# Patient Record
Sex: FEMALE | Race: WHITE | NOT HISPANIC OR LATINO | Employment: FULL TIME | URBAN - METROPOLITAN AREA
[De-identification: names, ages, dates, MRNs, and addresses within clinical notes are randomized per-mention and may not be internally consistent; named-entity substitution may affect disease eponyms.]

---

## 2021-02-09 ENCOUNTER — OFFICE VISIT (OUTPATIENT)
Dept: PULMONOLOGY | Facility: CLINIC | Age: 36
End: 2021-02-09

## 2021-02-09 VITALS
DIASTOLIC BLOOD PRESSURE: 86 MMHG | TEMPERATURE: 97.7 F | SYSTOLIC BLOOD PRESSURE: 136 MMHG | WEIGHT: 203.6 LBS | HEART RATE: 80 BPM | BODY MASS INDEX: 36.07 KG/M2 | OXYGEN SATURATION: 97 % | HEIGHT: 63 IN

## 2021-02-09 DIAGNOSIS — R05.9 COUGH: Primary | ICD-10-CM

## 2021-02-09 DIAGNOSIS — U09.9 POST-COVID SYNDROME: ICD-10-CM

## 2021-02-09 DIAGNOSIS — R53.83 FATIGUE, UNSPECIFIED TYPE: ICD-10-CM

## 2021-02-09 LAB
ANION GAP SERPL CALCULATED.3IONS-SCNC: 11.2 MMOL/L (ref 5–15)
BASOPHILS # BLD AUTO: 0.04 10*3/MM3 (ref 0–0.2)
BASOPHILS NFR BLD AUTO: 0.4 % (ref 0–1.5)
BUN SERPL-MCNC: 14 MG/DL (ref 6–20)
BUN/CREAT SERPL: 23 (ref 7–25)
CALCIUM SPEC-SCNC: 9.5 MG/DL (ref 8.6–10.5)
CHLORIDE SERPL-SCNC: 102 MMOL/L (ref 98–107)
CO2 SERPL-SCNC: 23.8 MMOL/L (ref 22–29)
CREAT SERPL-MCNC: 0.61 MG/DL (ref 0.57–1)
DEPRECATED RDW RBC AUTO: 44.8 FL (ref 37–54)
EOSINOPHIL # BLD AUTO: 0.39 10*3/MM3 (ref 0–0.4)
EOSINOPHIL NFR BLD AUTO: 3.6 % (ref 0.3–6.2)
ERYTHROCYTE [DISTWIDTH] IN BLOOD BY AUTOMATED COUNT: 14.2 % (ref 12.3–15.4)
GFR SERPL CREATININE-BSD FRML MDRD: 112 ML/MIN/1.73
GLUCOSE SERPL-MCNC: 102 MG/DL (ref 65–99)
HCT VFR BLD AUTO: 41.8 % (ref 34–46.6)
HGB BLD-MCNC: 14.2 G/DL (ref 12–15.9)
IMM GRANULOCYTES # BLD AUTO: 0.05 10*3/MM3 (ref 0–0.05)
IMM GRANULOCYTES NFR BLD AUTO: 0.5 % (ref 0–0.5)
LYMPHOCYTES # BLD AUTO: 2.21 10*3/MM3 (ref 0.7–3.1)
LYMPHOCYTES NFR BLD AUTO: 20.7 % (ref 19.6–45.3)
MCH RBC QN AUTO: 29.4 PG (ref 26.6–33)
MCHC RBC AUTO-ENTMCNC: 34 G/DL (ref 31.5–35.7)
MCV RBC AUTO: 86.5 FL (ref 79–97)
MONOCYTES # BLD AUTO: 0.84 10*3/MM3 (ref 0.1–0.9)
MONOCYTES NFR BLD AUTO: 7.9 % (ref 5–12)
NEUTROPHILS NFR BLD AUTO: 66.9 % (ref 42.7–76)
NEUTROPHILS NFR BLD AUTO: 7.16 10*3/MM3 (ref 1.7–7)
NRBC BLD AUTO-RTO: 0 /100 WBC (ref 0–0.2)
PLATELET # BLD AUTO: 232 10*3/MM3 (ref 140–450)
PMV BLD AUTO: 10.8 FL (ref 6–12)
POTASSIUM SERPL-SCNC: 4.3 MMOL/L (ref 3.5–5.2)
RBC # BLD AUTO: 4.83 10*6/MM3 (ref 3.77–5.28)
SODIUM SERPL-SCNC: 137 MMOL/L (ref 136–145)
WBC # BLD AUTO: 10.69 10*3/MM3 (ref 3.4–10.8)

## 2021-02-09 PROCEDURE — 94375 RESPIRATORY FLOW VOLUME LOOP: CPT | Performed by: INTERNAL MEDICINE

## 2021-02-09 PROCEDURE — 94618 PULMONARY STRESS TESTING: CPT | Performed by: INTERNAL MEDICINE

## 2021-02-09 PROCEDURE — 94729 DIFFUSING CAPACITY: CPT | Performed by: INTERNAL MEDICINE

## 2021-02-09 PROCEDURE — 99204 OFFICE O/P NEW MOD 45 MIN: CPT | Performed by: INTERNAL MEDICINE

## 2021-02-09 PROCEDURE — 85025 COMPLETE CBC W/AUTO DIFF WBC: CPT | Performed by: INTERNAL MEDICINE

## 2021-02-09 PROCEDURE — 94726 PLETHYSMOGRAPHY LUNG VOLUMES: CPT | Performed by: INTERNAL MEDICINE

## 2021-02-09 PROCEDURE — 80048 BASIC METABOLIC PNL TOTAL CA: CPT | Performed by: INTERNAL MEDICINE

## 2021-02-09 NOTE — PROGRESS NOTES
"Pulmonary Clinic  Initial Office Evaluation     REFERRING PHYSICIAN:  Sue Russell APRN    Subjective   Reason for Visit:     Cough and Shortness of Breath   Covid 19 Pneumonia January 2021    HPI     12/30/20 Sxs begun: cough, sneeze, dyspnea, fevers, fatigue, chest tightness, anosmia, ageusia.   01/02/21 SARS-CoV-2 PCR (+)   01/18/21 Office visit with her PCP, CXR indicated pneumoina.   01/27/21 Cough and dyspnea, persisted.   Round of steroids x 10 days, and Azithromycin   02/03/21 Office visit, started on Breo.     PRIEST persists.  Dry coughs persists.  Her  was also positive, but recovered much faster than her, and he was less symptomatic.  She has never had a SpO2 checked during this time.     PMH: She  has a past medical history of Elevated blood pressure reading.    PSxH: She has no past surgical history on file.        There is no immunization history on file for this patient.      Medications:     Current Outpatient Medications:   •  Fluticasone Furoate-Vilanterol (Breo Ellipta) 100-25 MCG/INH inhaler, Inhale 1 puff Daily., Disp: , Rfl:     Allergies: She is allergic to lasix [furosemide].    FH: She family history includes COPD in her mother; Hypertension in her father.    SH: She  reports that she has never smoked. She has never used smokeless tobacco. She reports current alcohol use. She reports that she does not use drugs.      The patient's relevant past medical, surgical and social history were reviewed and updated in Epic as appropriate.    ROS:   Review of Systems   Constitutional: Positive for fatigue and fever.   HENT: Positive for rhinorrhea and sneezing.    Respiratory: Positive for cough, chest tightness and shortness of breath.    Gastrointestinal: Positive for constipation.       Objective   OBJECTIVE     Vitals:    02/09/21 0931   BP: 136/86   Pulse: 80   Temp: 97.7 °F (36.5 °C)   SpO2: 97%  Comment: resting, room air   Weight: 92.4 kg (203 lb 9.6 oz)   Height: 158.8 cm (62.5\") "     Body mass index is 36.65 kg/m².    Physical Examination    Constitutional:  No acute distress.   Neck: No JVD.   Cardiovascular: Normal rate, regular and rhythm.  No murmurs, gallop or rub.   Respiratory: No adventitious sounds.   Extremities: No Edema     Result Review      Diagnostic Data  CXR 01/1/21 ([facility]): Mild bilateral interstitial and airspace disease.  CXR 02/09/21  NAD       Spirometry 2/9/2021:  Normal Spirometry.  The maximum voluntary ventilation (MVV) is normal.  Lung volumes are normal.   The diffusing capacity is normal.    Exercise Oximetry:  Exercise pulse oximetry showed a SpO2 cesario = 98% on room air. Distance 560 ft. Percent of predicted: 65 %    SpO2 Readings from Last 3 Encounters:   02/09/21 97%        Assessment/Plan   ASSESSMENT / PLAN     Assessment:    1. Post-Covid syndrome  a. Dyspnea, chronic cough, fatigue.  2. Elevated BP, but no diagnosis of HTN. Currently keeping a log, before deciding on medications.      Plan:  Orders Placed This Encounter   Procedures   • XR Chest PA & Lateral   • CT Angiogram Chest   • CT Chest Hi Resolution   • Basic Metabolic Panel   • D-dimer, Quantitative   • Pulmonary Function Test   • CBC & Differential     Patient Instructions   1. CT scan chest (CTA and HRCT)  2. RTC with results    Return in about 2 weeks (around 2/23/2021) for Recheck with Results.    I discussed the diagnosis, evaluation, and  treatment options with the patient and/or appropriate family members.    Thank you very much for allowing me to participate in the care of your patient.    I spent 48 minutes on this date of service. This time includes time spent by me in the following activities:preparing for the visit, reviewing tests, obtaining and/or reviewing a separately obtained history, performing a medically appropriate examination, counseling the patient/family/caregiver, ordering medications, tests, or procedures, and/or documenting information in the medical  record    Rafael Tanner MD, FACP, FCCP, [facility]  Intensive Care Medicine and Pulmonary Medicine     C.C.: Sue Russell, APRN

## 2021-02-26 ENCOUNTER — HOSPITAL ENCOUNTER (OUTPATIENT)
Dept: CT IMAGING | Facility: HOSPITAL | Age: 36
Discharge: HOME | End: 2021-02-26
Admitting: INTERNAL MEDICINE

## 2021-02-26 DIAGNOSIS — U09.9 POST-COVID SYNDROME: ICD-10-CM

## 2021-02-26 PROCEDURE — 71275 CT ANGIOGRAPHY CHEST: CPT

## 2021-02-26 PROCEDURE — 0 IOPAMIDOL PER 1 ML: Performed by: INTERNAL MEDICINE

## 2021-02-26 PROCEDURE — 71250 CT THORAX DX C-: CPT

## 2021-02-26 RX ADMIN — IOPAMIDOL 75 ML: 755 INJECTION, SOLUTION INTRAVENOUS at 13:58

## 2021-03-09 ENCOUNTER — OFFICE VISIT (OUTPATIENT)
Dept: PULMONOLOGY | Facility: CLINIC | Age: 36
End: 2021-03-09

## 2021-03-09 VITALS
BODY MASS INDEX: 35.79 KG/M2 | TEMPERATURE: 97.1 F | SYSTOLIC BLOOD PRESSURE: 130 MMHG | HEIGHT: 63 IN | DIASTOLIC BLOOD PRESSURE: 90 MMHG | RESPIRATION RATE: 18 BRPM | WEIGHT: 202 LBS | HEART RATE: 96 BPM | OXYGEN SATURATION: 99 %

## 2021-03-09 DIAGNOSIS — K80.20 GALLSTONES: Primary | ICD-10-CM

## 2021-03-09 PROBLEM — U09.9 POST-COVID SYNDROME: Status: ACTIVE | Noted: 2021-03-09

## 2021-03-09 PROBLEM — I10 HTN (HYPERTENSION): Status: ACTIVE | Noted: 2021-03-09

## 2021-03-09 PROCEDURE — 99214 OFFICE O/P EST MOD 30 MIN: CPT | Performed by: INTERNAL MEDICINE

## 2021-03-09 RX ORDER — LISINOPRIL 5 MG/1
5 TABLET ORAL DAILY
COMMUNITY
Start: 2021-02-17

## 2021-03-09 NOTE — PROGRESS NOTES
"Pulmonary Follow-up Visit     Subjective   Reason for Visit:   Post CT (f/u)    SUBJECTIVE     LAST OFFICE VISIT:  2/9/2021    Doing well.  Main c/o FATIGUE.  Occ coughing spell, and some chest tightnes.    No fevers.  No sputum production.  No wheezing.    Here to discuss results from CT, she read the report, but she does not know what those terms mean.     Tobacco History: She  reports that she has never smoked. She has never used smokeless tobacco.     ROS:  Review of Systems   Constitutional: Positive for fatigue.   Respiratory: Positive for cough, chest tightness and shortness of breath.    Psychiatric/Behavioral: Positive for sleep disturbance (Female, fatigue, HBP).       Objective   OBJECTIVE     Vitals:    03/09/21 1339   BP: 130/90   BP Location: Left arm   Patient Position: Sitting   Cuff Size: Adult   Pulse: 96   Resp: 18   Temp: 97.1 °F (36.2 °C)   SpO2: 99%  Comment: room air at rest   Weight: 91.6 kg (202 lb)   Height: 158.8 cm (62.5\")     Body mass index is 36.36 kg/m².    Physical Examination    Constitutional:  No acute distress.   Neck: No JVD.   Cardiovascular: Normal rate, regular and rhythm.  No murmurs, gallop or rub.   Respiratory: No adventitious sounds.   Extremities: No Edema     Diagnostic Data    CT Angiogram Chest    Result Date: 2/27/2021  1. No evidence of pulmonary embolus or other clearly acute chest disease. 2. Mild symmetric interstitial changes in the posterior portions of both lungs, which is thought to represent incidental dependent atelectasis in a supine patient. 3. Contracted gallbladder filled with gallstones. No visible inflammation.  D:  02/26/2021 E:  02/26/2021  This report was finalized on 2/27/2021 7:32 AM by Dr. Donell Prater MD.      CT Chest Hi Resolution Diagnostic    Result Date: 2/27/2021  No evidence of significant pulmonary interstitial disease or significant acute or chronic chest disease. Evidence of old granulomatous disease, and gallstones again incidentally " "noted.  D:  02/26/2021 E:  02/26/2021    This report was finalized on 2/27/2021 7:50 AM by Dr. Donell Prater MD.      XR Chest PA & Lateral    Result Date: 2/9/2021  No acute parenchymal disease. Rafael Tanner MD, FACP, FCCP, [facility] Intensive Care Medicine, Nutrition Support and Pulmonary Medicine     SpO2 Readings from Last 3 Encounters:   03/09/21 99%   02/09/21 97%      Assessment/Plan   ASSESSMENT / PLAN     Assessment:    1. Gallstones      There is no problem list on file for this patient.    1. Post-Covid syndrome (Symptoms started 12/30/20)  a. Fatigue about the same. Cough improving.  b. CT only showed minimal interstitial changed in the CTA,  But HRCT showed no interstitial disease. Old granulomatous disease present. Incidental finding of a \"contracted gallbladder filled with gallstones\".  2. HTN. She was started on ACEI by her PCP. BP better.    Plan:  Orders Placed This Encounter   Procedures   • Ambulatory Referral to General Surgery     Patient Instructions   3. OK to get the Covid 19 Vaccine  4. General Surgery re: gallstones.  5. RTC in 2 months    Return in about 2 months (around 5/9/2021).    I discussed the diagnosis, evaluation, and  treatment options with the patient and/or appropriate family members.    Thank you very much for allowing me to participate in the care of your patient.    I spent 31 minutes on this date of service. This time includes time spent by me in the following activities:preparing for the visit, reviewing tests, obtaining and/or reviewing a separately obtained history, performing a medically appropriate examination, counseling the patient/family/caregiver, ordering medications, tests, or procedures, and/or documenting information in the medical record.    ABDELRAHMAN.: Sue Russell, INES                    "

## 2021-03-16 ENCOUNTER — OFFICE VISIT (OUTPATIENT)
Dept: SURGERY | Facility: CLINIC | Age: 36
End: 2021-03-16

## 2021-03-16 ENCOUNTER — LAB (OUTPATIENT)
Dept: LAB | Facility: HOSPITAL | Age: 36
End: 2021-03-16

## 2021-03-16 ENCOUNTER — HOSPITAL ENCOUNTER (OUTPATIENT)
Dept: GENERAL RADIOLOGY | Facility: HOSPITAL | Age: 36
Discharge: HOME | End: 2021-03-16

## 2021-03-16 VITALS
OXYGEN SATURATION: 99 % | WEIGHT: 200 LBS | HEART RATE: 110 BPM | BODY MASS INDEX: 35.44 KG/M2 | HEIGHT: 63 IN | TEMPERATURE: 98.6 F | SYSTOLIC BLOOD PRESSURE: 132 MMHG | DIASTOLIC BLOOD PRESSURE: 96 MMHG

## 2021-03-16 DIAGNOSIS — K80.12 CALCULUS OF GALLBLADDER WITH ACUTE ON CHRONIC CHOLECYSTITIS WITHOUT OBSTRUCTION: ICD-10-CM

## 2021-03-16 DIAGNOSIS — Z01.818 PREOP TESTING: Primary | ICD-10-CM

## 2021-03-16 DIAGNOSIS — Z01.818 PREOP TESTING: ICD-10-CM

## 2021-03-16 DIAGNOSIS — R10.10 PAIN OF UPPER ABDOMEN: Primary | ICD-10-CM

## 2021-03-16 LAB
ALBUMIN SERPL-MCNC: 4.9 G/DL (ref 3.5–5.2)
ALBUMIN/GLOB SERPL: 1.8 G/DL
ALP SERPL-CCNC: 76 U/L (ref 39–117)
ALT SERPL W P-5'-P-CCNC: 22 U/L (ref 1–33)
ANION GAP SERPL CALCULATED.3IONS-SCNC: 12.6 MMOL/L (ref 5–15)
AST SERPL-CCNC: 17 U/L (ref 1–32)
B-HCG UR QL: NEGATIVE
BILIRUB SERPL-MCNC: 0.3 MG/DL (ref 0–1.2)
BUN SERPL-MCNC: 16 MG/DL (ref 6–20)
BUN/CREAT SERPL: 19.5 (ref 7–25)
CALCIUM SPEC-SCNC: 9.2 MG/DL (ref 8.6–10.5)
CHLORIDE SERPL-SCNC: 103 MMOL/L (ref 98–107)
CO2 SERPL-SCNC: 22.4 MMOL/L (ref 22–29)
CREAT SERPL-MCNC: 0.82 MG/DL (ref 0.57–1)
DEPRECATED RDW RBC AUTO: 50.5 FL (ref 37–54)
ERYTHROCYTE [DISTWIDTH] IN BLOOD BY AUTOMATED COUNT: 15.3 % (ref 12.3–15.4)
GFR SERPL CREATININE-BSD FRML MDRD: 79 ML/MIN/1.73
GLOBULIN UR ELPH-MCNC: 2.7 GM/DL
GLUCOSE SERPL-MCNC: 93 MG/DL (ref 65–99)
HCT VFR BLD AUTO: 42 % (ref 34–46.6)
HGB BLD-MCNC: 14 G/DL (ref 12–15.9)
MCH RBC QN AUTO: 29.7 PG (ref 26.6–33)
MCHC RBC AUTO-ENTMCNC: 33.3 G/DL (ref 31.5–35.7)
MCV RBC AUTO: 89.2 FL (ref 79–97)
PLATELET # BLD AUTO: 226 10*3/MM3 (ref 140–450)
PMV BLD AUTO: 10.6 FL (ref 6–12)
POTASSIUM SERPL-SCNC: 3.7 MMOL/L (ref 3.5–5.2)
PROT SERPL-MCNC: 7.6 G/DL (ref 6–8.5)
RBC # BLD AUTO: 4.71 10*6/MM3 (ref 3.77–5.28)
SARS-COV-2 RNA PNL SPEC NAA+PROBE: NOT DETECTED
SODIUM SERPL-SCNC: 138 MMOL/L (ref 136–145)
WBC # BLD AUTO: 8.46 10*3/MM3 (ref 3.4–10.8)

## 2021-03-16 PROCEDURE — 71046 X-RAY EXAM CHEST 2 VIEWS: CPT

## 2021-03-16 PROCEDURE — 87635 SARS-COV-2 COVID-19 AMP PRB: CPT

## 2021-03-16 PROCEDURE — 80053 COMPREHEN METABOLIC PANEL: CPT

## 2021-03-16 PROCEDURE — 99244 OFF/OP CNSLTJ NEW/EST MOD 40: CPT | Performed by: SURGERY

## 2021-03-16 PROCEDURE — 36415 COLL VENOUS BLD VENIPUNCTURE: CPT

## 2021-03-16 PROCEDURE — C9803 HOPD COVID-19 SPEC COLLECT: HCPCS

## 2021-03-16 PROCEDURE — 81025 URINE PREGNANCY TEST: CPT

## 2021-03-16 PROCEDURE — 85027 COMPLETE CBC AUTOMATED: CPT

## 2021-03-16 RX ORDER — CIPROFLOXACIN 750 MG/1
750 TABLET, FILM COATED ORAL 2 TIMES DAILY
Qty: 10 TABLET | Refills: 0 | Status: SHIPPED | OUTPATIENT
Start: 2021-03-16 | End: 2021-03-21

## 2021-03-16 NOTE — PROGRESS NOTES
Patient: Albertina Ochoa    YOB: 1985    Date: 03/16/2021    Primary Care Provider: Sue Russell APRN    Chief Complaint   Patient presents with   • Abdominal Pain   • Back Pain       SUBJECTIVE:    History of present illness:  I saw the patient in the office today as a consultation for evaluation and treatment of recent finding of cholelithiasis.  Patient had CT scan of the chest performed 02/26/2021 which incidentallly showed gallstones. Patient complains of abdominal pain, back pain, bloating, and constipation.    Apparently this gotten progressively worse over the past several days, she complains of sharp right upper quadrant pain that radiates into the mid back region associated with nausea.  Eating makes it worse, nothing makes it better.    It is interesting that the patient did have an episode of Covid in December, she was not hospitalized but she did have an infiltrate.  She did have a recent CT scan of the chest that showed no evidence of infiltration.    The following portions of the patient's history were reviewed and updated as appropriate: allergies, current medications, past family history, past medical history, past social history, past surgical history and problem list.    Review of Systems   Constitutional: Positive for appetite change. Negative for chills, fatigue, fever and unexpected weight change.   HENT: Negative for congestion, hearing loss, nosebleeds, postnasal drip, trouble swallowing and voice change.    Eyes: Negative for photophobia and visual disturbance.   Respiratory: Negative for apnea, cough, choking, chest tightness, shortness of breath and wheezing.    Cardiovascular: Negative for chest pain, palpitations and leg swelling.   Gastrointestinal: Positive for abdominal pain and constipation. Negative for abdominal distention, anal bleeding, blood in stool, diarrhea, nausea, rectal pain and vomiting.   Endocrine: Negative for cold intolerance and heat intolerance.  "  Genitourinary: Negative for difficulty urinating, dysuria, flank pain and frequency.   Musculoskeletal: Negative for arthralgias, back pain and gait problem.   Skin: Negative for color change, rash and wound.   Neurological: Negative for dizziness, seizures, syncope, speech difficulty, weakness, light-headedness, numbness and headaches.   Hematological: Negative for adenopathy. Does not bruise/bleed easily.   Psychiatric/Behavioral: Negative for agitation, behavioral problems, confusion and hallucinations. The patient is not nervous/anxious.        History:  Past Medical History:   Diagnosis Date   • Elevated blood pressure reading    • Hypertension        History reviewed. No pertinent surgical history.    Family History   Problem Relation Age of Onset   • COPD Mother    • Hypertension Father        Social History     Tobacco Use   • Smoking status: Never Smoker   • Smokeless tobacco: Never Used   Substance Use Topics   • Alcohol use: Yes     Comment: occ, whiskey   • Drug use: Never       Allergies:  Allergies   Allergen Reactions   • Lasix [Furosemide] Swelling     Can get the flu shot, per patient.       Medications:    Current Outpatient Medications:   •  ciprofloxacin (CIPRO) 750 MG tablet, Take 1 tablet by mouth 2 (Two) Times a Day for 5 days., Disp: 10 tablet, Rfl: 0  •  Fluticasone Furoate-Vilanterol (Breo Ellipta) 100-25 MCG/INH inhaler, Inhale 1 puff Daily., Disp: , Rfl:   •  lisinopril (PRINIVIL,ZESTRIL) 5 MG tablet, Take 5 mg by mouth Daily., Disp: , Rfl:     OBJECTIVE:    Vital Signs:   Vitals:    03/16/21 1540   BP: 132/96   Pulse: 110   Temp: 98.6 °F (37 °C)   SpO2: 99%   Weight: 90.7 kg (200 lb)   Height: 158.8 cm (62.5\")       Physical Exam:   General Appearance:    Alert, cooperative, in no acute distress   Head:    Normocephalic, without obvious abnormality, atraumatic   Eyes:            Lids and lashes normal, conjunctivae and sclerae normal, no   icterus, no pallor, corneas clear, PERRLA "   Ears:    Ears appear intact with no abnormalities noted   Throat:   No oral lesions, no thrush, oral mucosa moist   Neck:   No adenopathy, supple, trachea midline, no thyromegaly, no   carotid bruit, no JVD   Lungs:     Clear to auscultation,respirations regular, even and                  unlabored    Heart:    Regular rhythm and normal rate, normal S1 and S2, no            murmur   Abdomen:     no masses, no organomegaly, soft non-tender, non-distended, no guarding, there is evidence of right upper quadrant tenderness, slight right upper quadrant tenderness to deep palpation   Extremities:   Moves all extremities well, no edema, no cyanosis, no             redness   Pulses:   Pulses palpable and equal bilaterally   Skin:   No bleeding, bruising or rash   Lymph nodes:   No palpable adenopathy   Neurologic:   Cranial nerves 2 - 12 grossly intact, sensation intact      Results Review:   I reviewed the patient's new clinical results.  I reviewed the patient's new imaging results and agree with the interpretation.  I reviewed the patient's other test results and agree with the interpretation    Review of Systems was reviewed and confirmed as accurate as documented by the MA.    ASSESSMENT/PLAN:    1. Pain of upper abdomen    2. Calculus of gallbladder with acute on chronic cholecystitis without obstruction        I had a detailed and extensive discussion with the patient in the office and they understand that they need to undergo laparoscopic cholecystectomy with intraoperative cholangiography or possible open cholecystectomy. Full risks and benefits of operative versus nonoperative intervention were discussed with the patient and these included things such as nonresolution of symptoms and possible worsening of symptoms without surgical intervention versus infection, bleeding, open cholecystectomy, common bile duct injury, postoperative biliary leakage, need for drain placement, possible inability to perform  cholangiography due to inflammation, postoperative abscess, etc with surgical intervention. The patient understands, agrees, and wishes to proceed with the surgical treatment plan as mentioned above. The patient had no questions for me at the end of the discussion.  I did draw a picture of the anatomy for the patient and used this in my informed consent.     I discussed the patients findings and my recommendations with patient.    I am going to have the patient go over to the hospital at this time to get laboratory values, chest x-ray, and Covid test and I have sent her in a prescription for Cipro.    Electronically signed by Jones Moss MD  03/16/21

## 2021-03-19 ENCOUNTER — OUTSIDE FACILITY SERVICE (OUTPATIENT)
Dept: SURGERY | Facility: CLINIC | Age: 36
End: 2021-03-19

## 2021-03-19 DIAGNOSIS — K80.12 CALCULUS OF GALLBLADDER WITH ACUTE ON CHRONIC CHOLECYSTITIS WITHOUT OBSTRUCTION: Primary | ICD-10-CM

## 2021-03-19 PROCEDURE — 47563 LAPARO CHOLECYSTECTOMY/GRAPH: CPT | Performed by: SURGERY

## 2021-03-19 RX ORDER — HYDROCODONE BITARTRATE AND ACETAMINOPHEN 7.5; 325 MG/1; MG/1
1 TABLET ORAL EVERY 6 HOURS PRN
Qty: 15 TABLET | Refills: 0 | Status: SHIPPED | OUTPATIENT
Start: 2021-03-19

## 2021-03-23 ENCOUNTER — OFFICE VISIT (OUTPATIENT)
Dept: SURGERY | Facility: CLINIC | Age: 36
End: 2021-03-23

## 2021-03-23 ENCOUNTER — LAB (OUTPATIENT)
Dept: LAB | Facility: HOSPITAL | Age: 36
End: 2021-03-23

## 2021-03-23 VITALS
DIASTOLIC BLOOD PRESSURE: 104 MMHG | HEART RATE: 64 BPM | HEIGHT: 63 IN | TEMPERATURE: 98.7 F | BODY MASS INDEX: 35.61 KG/M2 | OXYGEN SATURATION: 98 % | SYSTOLIC BLOOD PRESSURE: 170 MMHG | WEIGHT: 201 LBS

## 2021-03-23 DIAGNOSIS — Z48.89 POSTOPERATIVE VISIT: ICD-10-CM

## 2021-03-23 DIAGNOSIS — R10.11 RIGHT UPPER QUADRANT ABDOMINAL PAIN: ICD-10-CM

## 2021-03-23 DIAGNOSIS — R10.11 RIGHT UPPER QUADRANT ABDOMINAL PAIN: Primary | ICD-10-CM

## 2021-03-23 LAB
ALBUMIN SERPL-MCNC: 4.8 G/DL (ref 3.5–5.2)
ALBUMIN/GLOB SERPL: 1.4 G/DL
ALP SERPL-CCNC: 91 U/L (ref 39–117)
ALT SERPL W P-5'-P-CCNC: 47 U/L (ref 1–33)
ANION GAP SERPL CALCULATED.3IONS-SCNC: 13.3 MMOL/L (ref 5–15)
AST SERPL-CCNC: 30 U/L (ref 1–32)
BILIRUB SERPL-MCNC: 0.4 MG/DL (ref 0–1.2)
BUN SERPL-MCNC: 7 MG/DL (ref 6–20)
BUN/CREAT SERPL: 12.3 (ref 7–25)
CALCIUM SPEC-SCNC: 9.5 MG/DL (ref 8.6–10.5)
CHLORIDE SERPL-SCNC: 107 MMOL/L (ref 98–107)
CO2 SERPL-SCNC: 21.7 MMOL/L (ref 22–29)
CREAT SERPL-MCNC: 0.57 MG/DL (ref 0.57–1)
DEPRECATED RDW RBC AUTO: 46.3 FL (ref 37–54)
ERYTHROCYTE [DISTWIDTH] IN BLOOD BY AUTOMATED COUNT: 14.4 % (ref 12.3–15.4)
GFR SERPL CREATININE-BSD FRML MDRD: 121 ML/MIN/1.73
GLOBULIN UR ELPH-MCNC: 3.4 GM/DL
GLUCOSE SERPL-MCNC: 96 MG/DL (ref 65–99)
HCT VFR BLD AUTO: 45.2 % (ref 34–46.6)
HGB BLD-MCNC: 15.2 G/DL (ref 12–15.9)
LIPASE SERPL-CCNC: 33 U/L (ref 13–60)
MCH RBC QN AUTO: 29.6 PG (ref 26.6–33)
MCHC RBC AUTO-ENTMCNC: 33.6 G/DL (ref 31.5–35.7)
MCV RBC AUTO: 87.9 FL (ref 79–97)
PLATELET # BLD AUTO: 209 10*3/MM3 (ref 140–450)
PMV BLD AUTO: 10 FL (ref 6–12)
POTASSIUM SERPL-SCNC: 3.3 MMOL/L (ref 3.5–5.2)
PROT SERPL-MCNC: 8.2 G/DL (ref 6–8.5)
RBC # BLD AUTO: 5.14 10*6/MM3 (ref 3.77–5.28)
SODIUM SERPL-SCNC: 142 MMOL/L (ref 136–145)
WBC # BLD AUTO: 7.63 10*3/MM3 (ref 3.4–10.8)

## 2021-03-23 PROCEDURE — 85027 COMPLETE CBC AUTOMATED: CPT

## 2021-03-23 PROCEDURE — 99024 POSTOP FOLLOW-UP VISIT: CPT | Performed by: SURGERY

## 2021-03-23 PROCEDURE — 83690 ASSAY OF LIPASE: CPT

## 2021-03-23 PROCEDURE — 36415 COLL VENOUS BLD VENIPUNCTURE: CPT

## 2021-03-23 PROCEDURE — 80053 COMPREHEN METABOLIC PANEL: CPT

## 2021-03-23 NOTE — PROGRESS NOTES
"Patient: Albertina Ochoa    YOB: 1985    Date: 03/23/2021    Primary Care Provider: Sue Russell APRN    Chief Complaint   Patient presents with   • Post-op     lap fide       History of present illness:  I saw the patient in the office today as a followup from their recent laparoscopic cholecystectomy.  They state that they are having constant sharp mid abdominal pain that is severe. She states the pain is causing vomiting and no relief with the pain medication prescribed.     Her pathology report did show chronic cholecystitis due to cholelithiasis.  She reports he has not had a bowel movement since surgery.    Vital Signs:   Vitals:    03/23/21 1346   BP: (!) 170/104   Pulse: 64   Temp: 98.7 °F (37.1 °C)   SpO2: 98%   Weight: 91.2 kg (201 lb)   Height: 158.8 cm (62.52\")       Physical Exam:   General Appearance:    Alert, cooperative, in no acute distress   Abdomen:     no masses, no organomegaly, soft non-tender, non-distended, no guarding, wounds are well healed     Assessment / Plan :    1. Right upper quadrant abdominal pain    2. Postoperative visit        I did discuss the situation with the patient today in the office and they have done well from their recent laparoscopic cholecystectomy.  I think most of her problems is related to a GI issue and she needs to try to move her bowels, I have encouraged her to start on some daily fiber supplementation.  Right upper quadrant ultrasound today showed no evidence of perihepatic fluid collections, I will get some outpatient laboratory values on the patient and then have her see us back in the office this coming Monday.  She knows to call us if she has any further problems.    Electronically signed by Jones Moss MD  03/23/21                "

## 2021-03-29 ENCOUNTER — OFFICE VISIT (OUTPATIENT)
Dept: SURGERY | Facility: CLINIC | Age: 36
End: 2021-03-29

## 2021-03-29 VITALS
SYSTOLIC BLOOD PRESSURE: 124 MMHG | HEIGHT: 63 IN | DIASTOLIC BLOOD PRESSURE: 76 MMHG | TEMPERATURE: 98 F | BODY MASS INDEX: 35.61 KG/M2 | WEIGHT: 201 LBS | HEART RATE: 86 BPM | OXYGEN SATURATION: 98 %

## 2021-03-29 DIAGNOSIS — Z48.89 POSTOPERATIVE VISIT: Primary | ICD-10-CM

## 2021-03-29 PROCEDURE — 99024 POSTOP FOLLOW-UP VISIT: CPT | Performed by: SURGERY

## 2021-03-29 NOTE — PROGRESS NOTES
"Patient: Albertina Ochoa    YOB: 1985    Date: 03/29/2021    Primary Care Provider: Sue Russell APRN    Chief Complaint   Patient presents with   • Post-op Follow-up     lap fide       History of present illness:  I saw the patient in the office today as a followup from their recent laparoscopic cholecystectomy.  They state that they have done well and are having no complaints.    Vital Signs:   Vitals:    03/29/21 1259   BP: 124/76   Pulse: 86   Temp: 98 °F (36.7 °C)   SpO2: 98%   Weight: 91.2 kg (201 lb)   Height: 158.8 cm (62.52\")       Physical Exam:   General Appearance:    Alert, cooperative, in no acute distress   Abdomen:     no masses, no organomegaly, soft non-tender, non-distended, no guarding, wounds are well healed     Assessment / Plan :    1. Postoperative visit        I did discuss the situation with the patient today in the office and they have done well from their recent laparoscopic cholecystectomy.  I have released the patient back to normal activity, they understand that they need to be careful about heavy lifting.  I need to see the patient back in the office only if they are having further problems, they know to call me if they are.  Patient may return to work in 7 days.    Electronically signed by Shabbir Ruth MD  03/29/21                  "